# Patient Record
Sex: FEMALE | Race: WHITE | Employment: OTHER | URBAN - METROPOLITAN AREA
[De-identification: names, ages, dates, MRNs, and addresses within clinical notes are randomized per-mention and may not be internally consistent; named-entity substitution may affect disease eponyms.]

---

## 2022-10-22 ENCOUNTER — HOSPITAL ENCOUNTER (EMERGENCY)
Age: 77
Discharge: HOME | End: 2022-10-22
Attending: STUDENT IN AN ORGANIZED HEALTH CARE EDUCATION/TRAINING PROGRAM
Payer: MEDICARE

## 2022-10-22 ENCOUNTER — APPOINTMENT (OUTPATIENT)
Dept: GENERAL RADIOLOGY | Age: 77
End: 2022-10-22
Attending: STUDENT IN AN ORGANIZED HEALTH CARE EDUCATION/TRAINING PROGRAM
Payer: MEDICARE

## 2022-10-22 VITALS
DIASTOLIC BLOOD PRESSURE: 76 MMHG | BODY MASS INDEX: 25.1 KG/M2 | SYSTOLIC BLOOD PRESSURE: 153 MMHG | RESPIRATION RATE: 18 BRPM | HEIGHT: 64 IN | OXYGEN SATURATION: 98 % | HEART RATE: 89 BPM | WEIGHT: 147 LBS | TEMPERATURE: 97.7 F
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
## 2022-10-22 PROCEDURE — 74011250636 HC RX REV CODE- 250/636: Performed by: STUDENT IN AN ORGANIZED HEALTH CARE EDUCATION/TRAINING PROGRAM

## 2022-10-22 PROCEDURE — 90714 TD VACC NO PRESV 7 YRS+ IM: CPT | Performed by: STUDENT IN AN ORGANIZED HEALTH CARE EDUCATION/TRAINING PROGRAM

## 2022-10-22 PROCEDURE — 99284 EMERGENCY DEPT VISIT MOD MDM: CPT

## 2022-10-22 PROCEDURE — 90471 IMMUNIZATION ADMIN: CPT

## 2022-10-22 PROCEDURE — 73120 X-RAY EXAM OF HAND: CPT

## 2022-10-22 PROCEDURE — 74011250637 HC RX REV CODE- 250/637: Performed by: STUDENT IN AN ORGANIZED HEALTH CARE EDUCATION/TRAINING PROGRAM

## 2022-10-22 RX ORDER — AMOXICILLIN AND CLAVULANATE POTASSIUM 875; 125 MG/1; MG/1
1 TABLET, FILM COATED ORAL EVERY 12 HOURS
Qty: 20 TABLET | Refills: 0 | Status: SHIPPED | OUTPATIENT
Start: 2022-10-22

## 2022-10-22 RX ORDER — ACETAMINOPHEN 500 MG
1000 TABLET ORAL ONCE
Status: COMPLETED | OUTPATIENT
Start: 2022-10-22 | End: 2022-10-22

## 2022-10-22 RX ORDER — AMOXICILLIN AND CLAVULANATE POTASSIUM 875; 125 MG/1; MG/1
1 TABLET, FILM COATED ORAL
Status: COMPLETED | OUTPATIENT
Start: 2022-10-22 | End: 2022-10-22

## 2022-10-22 RX ADMIN — TETANUS AND DIPHTHERIA TOXOIDS ADSORBED 0.5 ML: 2; 2 INJECTION INTRAMUSCULAR at 21:06

## 2022-10-22 RX ADMIN — ACETAMINOPHEN 1000 MG: 500 TABLET ORAL at 21:03

## 2022-10-22 RX ADMIN — AMOXICILLIN AND CLAVULANATE POTASSIUM 1 TABLET: 875; 125 TABLET, FILM COATED ORAL at 21:04

## 2022-10-23 NOTE — ED PROVIDER NOTES
HPI     Date of Service:  10/22/2022    Patient:  Chapin Mac    Chief Complaint:  Wound Check       HPI:  Chapin Mac is a 78 y.o.  female with a past medical history of HLD, hypothyroidism who presents for evaluation of skin wound after cat bite. Patient notes earlier today she was bit in her left hand by her cat. States her cat vaccines are up-to-date. She is unsure if her tetanus is up-to-date. States pain has been worsening as well as swelling since the bite. She states she did extensively wash it at home and use hydrogen peroxide. No fevers. No other recent illness. Past Medical History:   Diagnosis Date    H/O colonoscopy with polypectomy     Hyperlipemia     Hypothyroid     Psychiatric disorder     Depression       Past Surgical History:   Procedure Laterality Date    HX ORTHOPAEDIC      christianson's neuroma-bilateral feet         No family history on file. Social History     Socioeconomic History    Marital status:      Spouse name: Not on file    Number of children: Not on file    Years of education: Not on file    Highest education level: Not on file   Occupational History    Not on file   Tobacco Use    Smoking status: Never    Smokeless tobacco: Not on file   Substance and Sexual Activity    Alcohol use: Yes    Drug use: Not on file    Sexual activity: Not on file   Other Topics Concern    Not on file   Social History Narrative    Not on file     Social Determinants of Health     Financial Resource Strain: Not on file   Food Insecurity: Not on file   Transportation Needs: Not on file   Physical Activity: Not on file   Stress: Not on file   Social Connections: Not on file   Intimate Partner Violence: Not on file   Housing Stability: Not on file         ALLERGIES: Erythromycin and Prednisone    Review of Systems   Constitutional:  Negative for chills and fever. HENT:  Negative for congestion and rhinorrhea. Eyes:  Negative for discharge and redness.    Respiratory:  Negative for cough and shortness of breath. Gastrointestinal:  Negative for diarrhea and vomiting. Musculoskeletal:  Positive for arthralgias. Skin:  Positive for wound. Negative for rash. Neurological:  Positive for speech difficulty. Negative for syncope. Psychiatric/Behavioral:  Negative for agitation and confusion. Vitals:    10/22/22 2045   BP: (!) 153/76   Pulse: 89   Resp: 18   Temp: 97.7 °F (36.5 °C)   SpO2: 98%   Weight: 66.7 kg (147 lb)   Height: 5' 4\" (1.626 m)            Physical Exam  Vitals and nursing note reviewed. Constitutional:       General: She is not in acute distress. Appearance: Normal appearance. She is not ill-appearing or toxic-appearing. HENT:      Head: Normocephalic and atraumatic. Eyes:      General: No scleral icterus. Extraocular Movements: Extraocular movements intact. Conjunctiva/sclera: Conjunctivae normal.   Cardiovascular:      Rate and Rhythm: Normal rate. Pulses: Normal pulses. Pulmonary:      Effort: Pulmonary effort is normal. No respiratory distress. Musculoskeletal:         General: Normal range of motion. Arms:    Skin:     General: Skin is warm and dry. Capillary Refill: Capillary refill takes less than 2 seconds. Neurological:      General: No focal deficit present. Mental Status: She is alert and oriented to person, place, and time. Psychiatric:         Mood and Affect: Mood normal.         Behavior: Behavior normal.        MDM  Number of Diagnoses or Management Options  Cat bite, initial encounter  Diagnosis management comments:     DECISION MAKING:  Lelo Johnson is a 78 y.o. female who comes in as above. Patient is afebrile and vital signs are stable. On my examination she is well-appearing, no acute distress and nontoxic. On examination of the left dorsal hand, there is a small puncture wound to the center of the hand with surrounding erythema and edema and overlying tenderness.   She has full range of motion at the wrist and fingers. Patient's tetanus will be updated today. X-ray negative for any obvious foreign body or fracture. Patient will be given first dose of antibiotic in the emergency department, prescription provided for complete course. She was given very strict ER return precautions and follow-up needs. She verbalized understanding and was discharged. Amount and/or Complexity of Data Reviewed  Tests in the radiology section of CPT®: reviewed           Procedures      LABS:  No results found for this or any previous visit (from the past 6 hour(s)). IMAGING:  XR HAND LT AP/LAT   Final Result   No acute fracture. No radiodense foreign body is seen in the soft   tissues. Medications During Visit:  Medications   acetaminophen (TYLENOL) tablet 1,000 mg (1,000 mg Oral Given 10/22/22 2103)   amoxicillin-clavulanate (AUGMENTIN) 875-125 mg per tablet 1 Tablet (1 Tablet Oral Given 10/22/22 2104)   tetanus-diphtheria toxoids-Td (Td) 2-2 Lf unit/0.5 mL injection 0.5 mL (0.5 mL IntraMUSCular Given 10/22/22 2106)         IMPRESSION:  1. Cat bite, initial encounter        DISPOSITION:  Discharged      Current Discharge Medication List        START taking these medications    Details   amoxicillin-clavulanate (Augmentin) 875-125 mg per tablet Take 1 Tablet by mouth every twelve (12) hours. Qty: 20 Tablet, Refills: 0  Start date: 10/22/2022              Follow-up Information       Follow up With Specialties Details Why Contact Info    Daily Nolan MD Family Medicine Schedule an appointment as soon as possible for a visit   [place] [phone]      [facility] EMERGENCY DEPT Emergency Medicine  If symptoms worsen [address] [place] [address]  [phone]              The patient is asked to follow-up with their primary care provider in the next several days. They are to call tomorrow for an appointment.   The patient is asked to return promptly for any increased concerns or worsening of symptoms. They can return to this emergency department or any other emergency department.     Kay Wan, DO

## 2022-10-23 NOTE — ED TRIAGE NOTES
Pt ambulatory to triage w/ c/o a cat bite on left hand this morning. C/o that the site hurts and has gotten red since the bite.

## 2022-10-23 NOTE — ED NOTES
Went over [employer] instructions with pt who had no further questions . Pt ambulating with steady gait and talking in complete sentences. Ano x 4 gcs 15.

## 2022-10-23 NOTE — DISCHARGE INSTRUCTIONS
Alternate Tylenol and ibuprofen every 4 hours for pain control. Elevate your hand, apply ice. Take antibiotics as prescribed. Please follow-up with your primary care doctor for ER follow-up visit. Return to the emergency department if your symptoms are worsening despite antibiotic therapy, if redness or swelling is rapidly progressing or any other concerns or problems.

## 2023-01-13 ENCOUNTER — HOSPITAL ENCOUNTER (EMERGENCY)
Dept: CT IMAGING | Age: 78
End: 2023-01-13
Attending: EMERGENCY MEDICINE
Payer: MEDICARE

## 2023-01-13 ENCOUNTER — HOSPITAL ENCOUNTER (EMERGENCY)
Age: 78
Discharge: HOME | End: 2023-01-13
Attending: EMERGENCY MEDICINE
Payer: MEDICARE

## 2023-01-13 VITALS
DIASTOLIC BLOOD PRESSURE: 73 MMHG | RESPIRATION RATE: 18 BRPM | HEART RATE: 77 BPM | SYSTOLIC BLOOD PRESSURE: 166 MMHG | OXYGEN SATURATION: 99 % | TEMPERATURE: 98.8 F

## 2023-01-13 DIAGNOSIS — S01.81XA FACIAL LACERATION, INITIAL ENCOUNTER: ICD-10-CM

## 2023-01-13 DIAGNOSIS — S09.90XA INJURY OF HEAD, INITIAL ENCOUNTER: Primary | ICD-10-CM

## 2023-01-13 PROCEDURE — 70486 CT MAXILLOFACIAL W/O DYE: CPT

## 2023-01-13 PROCEDURE — 99284 EMERGENCY DEPT VISIT MOD MDM: CPT

## 2023-01-13 PROCEDURE — 70450 CT HEAD/BRAIN W/O DYE: CPT

## 2023-01-13 NOTE — ED TRIAGE NOTES
Patient with mechanical GLF. Swelling to left eye with small laceration under eye. Patient hit face on asphalt. Denies blood thinners, LOC, vision changes. Blood noted in eye.      Tetanus shot about 1 month ago from cat bite

## 2023-01-13 NOTE — ED PROVIDER NOTES
72-year-old female history of hypothyroidism, depression presents to the emergency department with her  after ground-level fall. She reports feeling generalized weakness for some time went to go walking and fell forward hitting her face on the ground with her glasses. She had no loss of consciousness. She complains of a laceration to her left cheek no significant pain, no C-spine pain, headache. No new neurodeficits. The history is provided by the patient, the spouse and medical records. Laceration   The incident occurred less than 1 hour ago. Past Medical History:   Diagnosis Date    H/O colonoscopy with polypectomy     Hyperlipemia     Hypothyroid     Psychiatric disorder     Depression       Past Surgical History:   Procedure Laterality Date    HX ORTHOPAEDIC      christianson's neuroma-bilateral feet         No family history on file. Social History     Socioeconomic History    Marital status:      Spouse name: Not on file    Number of children: Not on file    Years of education: Not on file    Highest education level: Not on file   Occupational History    Not on file   Tobacco Use    Smoking status: Never    Smokeless tobacco: Not on file   Substance and Sexual Activity    Alcohol use: Yes    Drug use: Not on file    Sexual activity: Not on file   Other Topics Concern    Not on file   Social History Narrative    Not on file     Social Determinants of Health     Financial Resource Strain: Not on file   Food Insecurity: Not on file   Transportation Needs: Not on file   Physical Activity: Not on file   Stress: Not on file   Social Connections: Not on file   Intimate Partner Violence: Not on file   Housing Stability: Not on file         ALLERGIES: Erythromycin and Prednisone    Review of Systems   Skin:  Positive for wound. Vitals:    01/13/23 1758   BP: (!) 166/73   Pulse: 77   Resp: 18   Temp: 98.8 °F (37.1 °C)   SpO2: 99%            Physical Exam  Vitals and nursing note reviewed. Constitutional:       General: She is not in acute distress. Appearance: Normal appearance. She is not ill-appearing, toxic-appearing or diaphoretic. HENT:      Head: Normocephalic. Comments: There are abrasions to left side of her face around her eye, there is a laceration which is vertically oriented and approximately 2 cm lateral and inferior to the lateral portion of the left eye     Mouth/Throat:      Mouth: Mucous membranes are moist.      Pharynx: Oropharynx is clear. Eyes:      Extraocular Movements: Extraocular movements intact. Pupils: Pupils are equal, round, and reactive to light. Cardiovascular:      Rate and Rhythm: Normal rate. Pulmonary:      Effort: Pulmonary effort is normal. No respiratory distress. Musculoskeletal:         General: No deformity or signs of injury. Normal range of motion. Cervical back: Normal range of motion and neck supple. Skin:     General: Skin is warm and dry. Capillary Refill: Capillary refill takes less than 2 seconds. Neurological:      General: No focal deficit present. Mental Status: She is alert and oriented to person, place, and time. Psychiatric:         Mood and Affect: Mood normal.         Behavior: Behavior normal.        Medical Decision Making  81-year-old female presents as above after ground-level fall. My review of her CT scans is reassuring, she was turned over to the oncoming physician pending reassessment, anticipated laceration repair, final read of CT scans. 7:01 PM  Change of shift. Care of patient signed over to Dr Rubi Gurrola. Handoff complete. Amount and/or Complexity of Data Reviewed  Radiology: ordered. Risk  Prescription drug management. ED Course as of 01/13/23 1901   Juhi Jose Alejandro Jan 13, 2023   7011 I have independently viewed the obtained radiographic images and note CT head and face without intracranial bleeding or fracture. There is a soft tissue deficit where the laceration is.  Will await radiology read.  [JM]      ED Course User Index  [JM] Serenity Bradford MD       Procedures

## 2023-06-09 ENCOUNTER — HOSPITAL ENCOUNTER (OUTPATIENT)
Facility: HOSPITAL | Age: 78
Discharge: HOME | End: 2023-06-09
Payer: MEDICARE

## 2023-06-09 DIAGNOSIS — R16.1 ENLARGEMENT, SPLEEN: ICD-10-CM

## 2023-06-09 PROCEDURE — 76700 US EXAM ABDOM COMPLETE: CPT
